# Patient Record
Sex: MALE | Race: WHITE | NOT HISPANIC OR LATINO | URBAN - METROPOLITAN AREA
[De-identification: names, ages, dates, MRNs, and addresses within clinical notes are randomized per-mention and may not be internally consistent; named-entity substitution may affect disease eponyms.]

---

## 2017-07-12 ENCOUNTER — IMPORTED ENCOUNTER (OUTPATIENT)
Dept: URBAN - METROPOLITAN AREA CLINIC 31 | Facility: CLINIC | Age: 67
End: 2017-07-12

## 2017-07-12 PROBLEM — H25.813: Noted: 2017-07-12

## 2017-07-12 PROBLEM — E11.9: Noted: 2017-07-12

## 2017-07-12 PROBLEM — H53.2: Noted: 2017-07-12

## 2017-07-12 PROCEDURE — 92015 DETERMINE REFRACTIVE STATE: CPT

## 2017-07-12 PROCEDURE — 92060 SENSORIMOTOR EXAMINATION: CPT

## 2017-07-12 PROCEDURE — 92014 COMPRE OPH EXAM EST PT 1/>: CPT

## 2017-07-12 NOTE — PATIENT DISCUSSION
1. Combined Types of Cataract OU: Explained how cataracts can effect vision. Recommend clinical observation. The patient was advised to contact us if any change or worsening of vision. 2. 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 Increase prism to 4.5 BD OD and 4.5 BU OS. Does not have to change immediately. Relatively comfortable with present glasses. 3.  DM II without sign of Diabetic Retinopathy OU:  Discussed the pathophysiology of diabetes and its effect on the eye. Stressed the importance of regular followup and good control of BS BP and Lipids to avoid future complications. 4. Return for an appointment in 1 year for comprehensive exam. with Dr. Jennifer Echevarria.

## 2018-07-12 ENCOUNTER — IMPORTED ENCOUNTER (OUTPATIENT)
Dept: URBAN - METROPOLITAN AREA CLINIC 31 | Facility: CLINIC | Age: 68
End: 2018-07-12

## 2018-07-12 PROBLEM — H43.812: Noted: 2018-07-12

## 2018-07-12 PROBLEM — H02.831: Noted: 2018-07-12

## 2018-07-12 PROBLEM — E11.9: Noted: 2018-07-12

## 2018-07-12 PROBLEM — H53.2: Noted: 2018-07-12

## 2018-07-12 PROBLEM — H25.813: Noted: 2018-07-12

## 2018-07-12 PROBLEM — H02.834: Noted: 2018-07-12

## 2018-07-12 PROCEDURE — 92250 FUNDUS PHOTOGRAPHY W/I&R: CPT

## 2018-07-12 PROCEDURE — 92015 DETERMINE REFRACTIVE STATE: CPT

## 2018-07-12 PROCEDURE — 92014 COMPRE OPH EXAM EST PT 1/>: CPT

## 2018-07-12 NOTE — PATIENT DISCUSSION
1. Combined Types of Cataract OU: Explained how cataracts can effect vision. Recommend clinical observation. The patient was advised to contact us if any change or worsening of vision. 2.  DM II without sign of Diabetic Retinopathy OU:  Discussed the pathophysiology of diabetes and its effect on the eye. Stressed the importance of regular followup and good control of BS BP and Lipids to avoid future complications. 3. Dermatochalasis OU:  Patient currently asymptomatic. Observe. 4. Refractive error - Glasses change optional. 5.  Return for an appointment in 1 year for Optos with Dr. Allison Rodriges. 6.  PVD OS: Patient was cautioned to call our office immediately if they experience a substantial change in their symptoms such as an increase in floaters persistent flashes loss of visual field (may appear as a shadow or a curtain) or decrease in visual acuity as these may indicate a retinal tear or detachment. If this is a new problem patient will need to return for re-examination  as determined by the [address] 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

## 2019-07-12 ENCOUNTER — IMPORTED ENCOUNTER (OUTPATIENT)
Dept: URBAN - METROPOLITAN AREA CLINIC 31 | Facility: CLINIC | Age: 69
End: 2019-07-12

## 2019-07-12 PROBLEM — H43.812: Noted: 2019-07-12

## 2019-07-12 PROBLEM — H53.2: Noted: 2019-07-12

## 2019-07-12 PROBLEM — H02.831: Noted: 2019-07-12

## 2019-07-12 PROBLEM — E11.9: Noted: 2019-07-12

## 2019-07-12 PROBLEM — H25.813: Noted: 2019-07-12

## 2019-07-12 PROBLEM — H02.834: Noted: 2019-07-12

## 2019-07-12 PROCEDURE — 92014 COMPRE OPH EXAM EST PT 1/>: CPT

## 2019-07-12 PROCEDURE — 92250 FUNDUS PHOTOGRAPHY W/I&R: CPT

## 2019-07-12 PROCEDURE — 92015 DETERMINE REFRACTIVE STATE: CPT

## 2019-07-12 NOTE — PATIENT DISCUSSION
1. Combined Types of Cataract OU: Explained how cataracts can effect vision. Recommend clinical observation. The patient was advised to contact us if any change or worsening of vision. 2.  DM II without sign of Diabetic Retinopathy OU:  Discussed the pathophysiology of diabetes and its effect on the eye. Stressed the importance of regular followup and good control of BS BP and Lipids to avoid future complications. 3. Dermatochalasis OU:  Patient currently asymptomatic. Observe. 4. Refractive error - Glasses change optional. 6.  PVD OS: Patient was cautioned to call our office immediately if they experience a substantial change in their symptoms such as an increase in floaters persistent flashes loss of visual field (may appear as a shadow or a curtain) or decrease in visual acuity as these may indicate a retinal tear or detachment. If this is a new problem patient will need to return for re-examination  as determined by the [address] 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

## 2020-06-15 NOTE — PATIENT DISCUSSION
renew CL Rx as is - pt wishes to sleep in CTL AMA.   ed daily option less likely to have risk of infx and long-term complications assoc with EW CTL use.

## 2020-07-14 ENCOUNTER — IMPORTED ENCOUNTER (OUTPATIENT)
Dept: URBAN - METROPOLITAN AREA CLINIC 31 | Facility: CLINIC | Age: 70
End: 2020-07-14

## 2020-07-14 PROBLEM — H25.813: Noted: 2020-07-14

## 2020-07-14 PROBLEM — E11.9: Noted: 2020-07-14

## 2020-07-14 PROBLEM — H02.831: Noted: 2020-07-14

## 2020-07-14 PROBLEM — H43.812: Noted: 2020-07-14

## 2020-07-14 PROBLEM — H53.2: Noted: 2020-07-14

## 2020-07-14 PROBLEM — H02.834: Noted: 2020-07-14

## 2020-07-14 PROCEDURE — 92014 COMPRE OPH EXAM EST PT 1/>: CPT

## 2020-07-14 PROCEDURE — 92015 DETERMINE REFRACTIVE STATE: CPT

## 2020-07-14 PROCEDURE — 92250 FUNDUS PHOTOGRAPHY W/I&R: CPT

## 2021-06-22 NOTE — PATIENT DISCUSSION
6/22/2021:  again advised against sleeping in CTL due to increased risk of infections and possible permanent VA loss.

## 2022-03-15 ENCOUNTER — IMPORTED ENCOUNTER (OUTPATIENT)
Dept: URBAN - METROPOLITAN AREA CLINIC 31 | Facility: CLINIC | Age: 72
End: 2022-03-15

## 2022-03-15 PROBLEM — H02.834: Noted: 2022-03-15

## 2022-03-15 PROBLEM — H53.2: Noted: 2022-03-15

## 2022-03-15 PROBLEM — H02.836: Noted: 2022-03-15

## 2022-03-15 PROBLEM — H43.812: Noted: 2022-03-15

## 2022-03-15 PROBLEM — H02.833: Noted: 2022-03-15

## 2022-03-15 PROBLEM — E11.9: Noted: 2022-03-15

## 2022-03-15 PROBLEM — H25.813: Noted: 2022-03-15

## 2022-03-15 PROBLEM — H25.812: Noted: 2022-03-15

## 2022-03-15 PROBLEM — H25.811: Noted: 2022-03-15

## 2022-03-15 PROBLEM — H02.831: Noted: 2022-03-15

## 2022-03-15 PROCEDURE — 92015 DETERMINE REFRACTIVE STATE: CPT

## 2022-03-15 PROCEDURE — 99214 OFFICE O/P EST MOD 30 MIN: CPT

## 2022-03-15 NOTE — PATIENT DISCUSSION
1. Combined Types of Cataract OU: Monitor. 2.  DM II without sign of Diabetic Retinopathy OU: Monitor yearly. 3. Dermatochalasis OU:  Patient currently asymptomatic. Observe. 4. Refractive error - Glasses change optional. Stay with present prism. 6. PVD OS: Monitor. 7. 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 8.  Return for an appointment in 1 year for comprehensive exam. with Dr. Huma Carranza

## 2022-04-02 ASSESSMENT — VISUAL ACUITY
OS_SC: 20/20+2
OS_SC: 20/25
OD_SC: 20/20+2
OD_SC: 20/20-2

## 2022-04-02 ASSESSMENT — TONOMETRY
OS_IOP_MMHG: 16
OS_IOP_MMHG: 15
OS_IOP_MMHG: 15
OD_IOP_MMHG: 16
OS_IOP_MMHG: 16
OS_IOP_MMHG: 13
OD_IOP_MMHG: 16

## 2022-07-25 NOTE — PATIENT DISCUSSION
I explained to the patient that even with successful cataract surgery, the vision will still be limited by the pre-existing amblyopia. The patient will not see better than their baseline vision.

## 2022-08-19 NOTE — PATIENT DISCUSSION
OK to proceed with IOL OS due to FD dec for Sx OS made today with [facility] and goal is for Synergy OS, goal of emmetropia.

## 2022-08-22 NOTE — PATIENT DISCUSSION
Adult Immunization Schedule  Vaccine How Often Disease Prevented Who Needs It   Influenza Every year Flu. This can be especially dangerous to elderly adults or people with immune disorders. All adults.   Tetanus, diphtheria (Td); or Tetanus, diphtheria, and pertussis (Tdap)* One dose of Tdap, then one dose of Td as a booster every 10 years Tetanus (lockjaw), a disease that causes muscles to spasm  Diphtheria, an infection that causes fever, weakness, and breathing problems  Pertussis, also known as whooping cough. This is a highly contagious disease that can cause serious illness. All adults  *This vaccine should be given during each pregnancy no matter how many years since the last vaccine. The vaccine increases protection for your . A  is too young to get the vaccine, but at the highest risk for severe illness and death from pertussis.   Varicella (Akira)** One series of 2 injections Chickenpox. This is a disease that causes itchy skin bumps, fever, and tiredness. It can lead to scarring, pneumonia, or brain inflammation. Adults who dont have evidence of immunity  **This vaccine should not be given to pregnant women. Women should avoid pregnancy for 4 weeks after the vaccine.   Human papillomavirus (HPV) One series of 3 injections Cervical cancer, caused by certain types of HPV  Vaginal and vulvar cancer  Penile cancer  Head and neck cancers  Anal cancer  Genital warts Females and males ages 26 and younger. Minimum age is 9 years.  (Ask your healthcare provider if this vaccine is right for you.)   Zoster--- 1 dose Herpes zoster (shingles), a painful rash marked by blisters Adults ages 60 and older.  ---You should not get this vaccine if your immune system is low. For example, if you have HIV, are taking medicines that suppress your immune system, or are getting cancer treatment.   Measles, mumps, rubella (MMR)** 1 or 2 doses, for ages 19 through 49; 1 dose for ages 50 and older if at risk Measles, a  6/22/2021:  again advised against sleeping in CTL due to increased risk of infections and possible permanent VA loss. disease marked by red spots, fever, and coughing  Mumps, a disease that causes swelling in the salivary glands. It may affect the ovaries or testes.  Rubella ([place] measles). This is a form of measles that can cause birth defects if a pregnant woman catches it. Adults born in 1957 or later who are not known to be immune to all 3 of these diseases. Ask your healthcare provider if you need a second dose.  **This vaccine should not be given to pregnant women. Women should avoid pregnancy for 4 weeks after vaccination.   Pneumococcal (PCV 13) 1 dose Pneumonia. This is an infection that causes inflammation in your lungs. It can lead to death. Adults ages 65 and older. Also, adults ages 19 and older with weak immune systems or any of these health conditions: chronic renal failure, nephrotic syndrome, or both. Or functional or anatomic asplenia, cerebrospinal fluid (CSF) leaks, or cochlear implants.   Pneumococcal (PPSV23)  1 or 2 doses Pneumonia.. This is an infection that causes inflammation in your lungs. It can lead to death. Adults ages 65 and older. Also, adults with chronic illnesses, such as asthma, COPD, heart disease, diabetes, liver disease, alcoholism, sickle cell disease, or history of splenectomy. In addition, adults with an immune disorder and adults who smoke cigarettes.   Meningococcal  (MCV or MPSV) 1 or more doses Meningococcal disease (bacterial meningitis). This is inflammation of the membrane covering the brain and spinal cord. It can lead to death. Adults with immune deficiencies or high risk of exposure. Also, college freshmen living in dormitories and  recruits.   Hepatitis A (HepA) One series of 2 injections Hepatitis A. This is an infection that can result in acute liver inflammation and yellow skin and whites of the eyes (jaundice). Adults with risk factors, such as clotting disorders or chronic liver disease, and adults with high risk of exposure   Hepatitis B (HepB) One series of 3  injections Hepatitis B. This is an infection that causes chronic, severe liver disease. Adults with high risk of exposure, such as healthcare providers and sanitation workers, and adults with diabetes   Travelers diseases As needed Infections such as cholera, typhoid, yellow fever, polio, rabies, meningococcal disease, hepatitis A, hepatitis B Adults traveling out of the country. Required vaccines will vary, depending on the country you visit. Check CDC website: www.cdc.gov.    ,  Based on the CDC National Immunization Program recommendations for adults.  Date Last Reviewed: 6/19/2014  © 8202-8555 [employer]. [address] [place] [address]. All rights reserved. This information is not intended as a substitute for professional medical care. Always follow your healthcare professional's instructions.        Prevention Guidelines, Men Ages 40 to 49  Screening tests and vaccines are an important part of managing your health. Health counseling is essential, too. Below are guidelines for these, for men ages 40 to 49. Talk with your healthcare provider to make sure youre up to date on what you need.  Screening Who needs it How often   Alcohol misuse All men in this age group At routine exams   Blood pressure All men in this age group Every 2 years if your blood pressure reading is less than 120/80 mm Hg; yearly if your systolic blood pressure reading is 120 to 139 mm Hg, or your diastolic blood pressure reading is 80 to 89 mm Hg   Depression All men in this age group At routine exams   Type 2 diabetes or prediabetes All adults beginning at age 45 and adults without symptoms at any age who are overweight or obese and have 1 or more other risk factors for diabetes At least every 3 years   Hepatitis C Men at increased risk for infection - talk with your healthcare provider At routine exams   High cholesterol or triglycerides All men ages 35 and older, and younger men at high risk for coronary artery  disease At least every 5 years   HIV All men At routine exams   Obesity All men in this age group At routine exams   Prostate cancer Starting at age 45, talk to healthcare provider about risks and benefits of digital rectal exam (LAWSON) and prostate-specific antigen (PSA) screening1 At routine exams   Syphilis Men at increased risk for infection - talk with your healthcare provider At routine exams   Tuberculosis Men at increased risk for infection - talk with your healthcare provider Check with your healthcare provider   Vision All men in this age group Every 2 to 4 years if no risk factors for eye disease2   Vaccine Who needs it How often   Chickenpox (varicella) All men in this age group who have no record of this infection or vaccine 2 doses; the second dose should be given at least 4 weeks after the first dose   Hepatitis A Men at increased risk for infection - talk with your healthcare provider 2 doses given at least 6 months apart   Hepatitis B Men at increased risk for infection - talk with your healthcare provider 3 doses over 6 months; second dose should be given 1 month after the first dose; the third dose should be given at least 2 months after the second dose and at least 4 months after the first dose   Haemophilus influenzae Type B (HIB) Men at increased risk for infection - talk with your healthcare provider 1 to 3 doses   Influenza (flu) All men in this age group Once a year   Measles, mumps, rubella (MMR) All men in this age group who have no record of these infections or vaccines 1 or 2 doses   Meningococcal Men at increased risk for infection - talk with your healthcare provider 1 or more doses   Pneumococcal conjugate vaccine (PCV13) and pneumococcal polysaccharide vaccine (PPSV23) Men at increased risk for infection - talk with your healthcare provider PCV13: 1 dose ages 19 to 65 (protects against 13 types of pneumococcal bacteria)     PPSV23: 1 to 2 doses through age 64, or 1 dose at 65 or older  (protects against 23 types of pneumococcal bacteria)      Tetanus/diphtheria/  pertussis (Td/Tdap) booster All men in this age group Td every 10 years, or a one-time dose of Tdap instead of a Td booster after age 18, then Td every 10 years   Counseling Who needs it How often   Diet and exercise Men who are overweight or obese When diagnosed, and then at routine exams   Sexually transmitted infection prevention Men at increased risk for infection - talk with your healthcare provider At routine exams   Use of daily aspirin Men ages 45 to 79 at risk for cardiovascular health problems At routine exams   Use of tobacco and the health affects it can cause All men in this age group Every exam   [address] [place] [address] [facility]   [facility] [employer]  Date Last Reviewed: 3/30/2015  © 5514-5898 [employer]. [address] [place] [address], [place] [address]. All rights reserved. This information is not intended as a substitute for professional medical care. Always follow your healthcare professional's instructions.

## 2023-05-18 ENCOUNTER — COMPREHENSIVE EXAM (OUTPATIENT)
Dept: URBAN - METROPOLITAN AREA CLINIC 29 | Facility: CLINIC | Age: 73
End: 2023-05-18

## 2023-05-18 DIAGNOSIS — H02.836: ICD-10-CM

## 2023-05-18 DIAGNOSIS — H53.2: ICD-10-CM

## 2023-05-18 DIAGNOSIS — H52.13: ICD-10-CM

## 2023-05-18 DIAGNOSIS — H52.203: ICD-10-CM

## 2023-05-18 DIAGNOSIS — H02.833: ICD-10-CM

## 2023-05-18 DIAGNOSIS — H25.813: ICD-10-CM

## 2023-05-18 DIAGNOSIS — H49.11: ICD-10-CM

## 2023-05-18 DIAGNOSIS — H52.4: ICD-10-CM

## 2023-05-18 DIAGNOSIS — E11.9: ICD-10-CM

## 2023-05-18 PROCEDURE — 99214 OFFICE O/P EST MOD 30 MIN: CPT

## 2023-05-18 PROCEDURE — 92015 DETERMINE REFRACTIVE STATE: CPT

## 2023-05-18 ASSESSMENT — TONOMETRY
OD_IOP_MMHG: 18
OS_IOP_MMHG: 17

## 2023-05-18 ASSESSMENT — VISUAL ACUITY
OS_CC: 20/25-1
OD_CC: 20/20-2

## 2024-01-04 NOTE — PATIENT DISCUSSION
Called patient to schedule injection and patient informed this RN that she has new insurance plan effective on 1/15/2024 and since her  is going through cancer treatment, she is waiting until February to get the injections.    NEW INSURANCE INFO:  [place] [employer] ST[employer]ION  Member ID# [id number]    This RN asked patient to upload photos of her insurance card to [employer] and to send our office a [employer] message to let us know when the new card is available in case the provided information is not enough information to process the referral.           No Retinal holes, Tears or Detachments.

## 2024-05-16 ENCOUNTER — COMPREHENSIVE EXAM (OUTPATIENT)
Dept: URBAN - METROPOLITAN AREA CLINIC 29 | Facility: CLINIC | Age: 74
End: 2024-05-16

## 2024-05-16 DIAGNOSIS — H53.2: ICD-10-CM

## 2024-05-16 DIAGNOSIS — E11.9: ICD-10-CM

## 2024-05-16 DIAGNOSIS — H57.813: ICD-10-CM

## 2024-05-16 DIAGNOSIS — H49.881: ICD-10-CM

## 2024-05-16 DIAGNOSIS — H02.833: ICD-10-CM

## 2024-05-16 DIAGNOSIS — H02.834: ICD-10-CM

## 2024-05-16 DIAGNOSIS — H02.836: ICD-10-CM

## 2024-05-16 DIAGNOSIS — H25.813: ICD-10-CM

## 2024-05-16 DIAGNOSIS — H49.11: ICD-10-CM

## 2024-05-16 PROCEDURE — 92014 COMPRE OPH EXAM EST PT 1/>: CPT

## 2024-05-16 ASSESSMENT — VISUAL ACUITY
OD_CC: 20/25
OU_CC: J1+
OS_CC: 20/30-1

## 2024-05-16 ASSESSMENT — TONOMETRY
OD_IOP_MMHG: 16
OS_IOP_MMHG: 16

## 2024-06-17 ENCOUNTER — CONSULTATION/EVALUATION (OUTPATIENT)
Dept: URBAN - METROPOLITAN AREA CLINIC 29 | Facility: CLINIC | Age: 74
End: 2024-06-17

## 2024-06-17 DIAGNOSIS — H49.881: ICD-10-CM

## 2024-06-17 DIAGNOSIS — E11.9: ICD-10-CM

## 2024-06-17 DIAGNOSIS — H49.11: ICD-10-CM

## 2024-06-17 DIAGNOSIS — H53.2: ICD-10-CM

## 2024-06-17 DIAGNOSIS — H43.812: ICD-10-CM

## 2024-06-17 DIAGNOSIS — H57.813: ICD-10-CM

## 2024-06-17 DIAGNOSIS — H02.836: ICD-10-CM

## 2024-06-17 DIAGNOSIS — H25.813: ICD-10-CM

## 2024-06-17 DIAGNOSIS — H02.833: ICD-10-CM

## 2024-06-17 DIAGNOSIS — H02.834: ICD-10-CM

## 2024-06-17 PROCEDURE — 92136 OPHTHALMIC BIOMETRY: CPT

## 2024-06-17 PROCEDURE — 99214 OFFICE O/P EST MOD 30 MIN: CPT

## 2024-06-17 ASSESSMENT — TONOMETRY
OD_IOP_MMHG: 15
OS_IOP_MMHG: 16

## 2024-06-17 ASSESSMENT — VISUAL ACUITY
OS_CC: 20/30
OS_CC: 20/25-2
OS_GLARE: 20/40
OD_CC: 20/25
OD_GLARE: 20/40
OD_CC: 20/30-2

## 2024-06-24 ENCOUNTER — SURGERY/PROCEDURE (OUTPATIENT)
Dept: URBAN - METROPOLITAN AREA SURGERY 17 | Facility: SURGERY | Age: 74
End: 2024-06-24

## 2024-06-24 DIAGNOSIS — H25.811: ICD-10-CM

## 2024-06-24 PROCEDURE — 66984 XCAPSL CTRC RMVL W/O ECP: CPT

## 2024-06-25 ENCOUNTER — POST-OP (OUTPATIENT)
Dept: URBAN - METROPOLITAN AREA CLINIC 29 | Facility: CLINIC | Age: 74
End: 2024-06-25

## 2024-06-25 DIAGNOSIS — Z96.1: ICD-10-CM

## 2024-06-25 PROCEDURE — 99024 POSTOP FOLLOW-UP VISIT: CPT

## 2024-06-25 ASSESSMENT — TONOMETRY
OD_IOP_MMHG: 22
OS_IOP_MMHG: 15

## 2024-06-25 ASSESSMENT — VISUAL ACUITY
OS_SC: 20/80-2
OD_SC: 20/40

## 2024-07-01 ENCOUNTER — SURGERY/PROCEDURE (OUTPATIENT)
Dept: URBAN - METROPOLITAN AREA SURGERY 17 | Facility: SURGERY | Age: 74
End: 2024-07-01

## 2024-07-01 DIAGNOSIS — H25.812: ICD-10-CM

## 2024-07-01 PROCEDURE — 66984 XCAPSL CTRC RMVL W/O ECP: CPT | Mod: 79,LT

## 2024-07-02 ENCOUNTER — POST-OP (OUTPATIENT)
Dept: URBAN - METROPOLITAN AREA CLINIC 29 | Facility: CLINIC | Age: 74
End: 2024-07-02

## 2024-07-02 DIAGNOSIS — Z96.1: ICD-10-CM

## 2024-07-02 PROCEDURE — 99024 POSTOP FOLLOW-UP VISIT: CPT

## 2024-07-02 ASSESSMENT — TONOMETRY
OD_IOP_MMHG: 12
OS_IOP_MMHG: 16

## 2024-07-02 ASSESSMENT — VISUAL ACUITY
OS_SC: 20/50
OS_PH: 20/30
OD_SC: 20/30

## 2024-07-09 ENCOUNTER — POST-OP (OUTPATIENT)
Dept: URBAN - METROPOLITAN AREA CLINIC 29 | Facility: CLINIC | Age: 74
End: 2024-07-09

## 2024-07-09 DIAGNOSIS — Z96.1: ICD-10-CM

## 2024-07-09 PROCEDURE — 99024 POSTOP FOLLOW-UP VISIT: CPT

## 2024-07-09 ASSESSMENT — VISUAL ACUITY
OS_SC: 20/100
OD_SC: 20/200
OS_SC: 20/20
OD_SC: 20/25

## 2024-07-09 ASSESSMENT — TONOMETRY
OD_IOP_MMHG: 12
OS_IOP_MMHG: 10

## 2024-10-09 NOTE — PATIENT DISCUSSION
1. Combined Types of Cataract OU: Explained how cataracts can effect vision. Recommend clinical observation. The patient was advised to contact us if any change or worsening of vision. 2.  DM II without sign of Diabetic Retinopathy OU: Monitor yearly. 3. Dermatochalasis OU:  Patient currently asymptomatic. Observe. 4. Refractive error - Glasses change optional. 6.  PVD OS: Monitor. 7. 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 8.  Return for an appointment in 1 year for comprehensive exam. with Dr. Sherry Mas. Last thyroid level done on 7/11/24. Also see MyChart from 7/11/24.       Thyroid is normal.   Written by Clementine Hernadez MA on 7/11/2024  1:51 PM CDT  Seen by patient Neetu Page on 10/8/2024  3:08 PM

## 2024-11-07 ENCOUNTER — FOLLOW UP (OUTPATIENT)
Dept: URBAN - METROPOLITAN AREA CLINIC 29 | Facility: CLINIC | Age: 74
End: 2024-11-07

## 2024-11-07 DIAGNOSIS — Z96.1: ICD-10-CM

## 2024-11-07 DIAGNOSIS — H52.4: ICD-10-CM

## 2024-11-07 DIAGNOSIS — E11.9: ICD-10-CM

## 2024-11-07 PROCEDURE — 99214 OFFICE O/P EST MOD 30 MIN: CPT

## 2024-11-07 PROCEDURE — 92015 DETERMINE REFRACTIVE STATE: CPT | Mod: NC

## 2025-03-06 ENCOUNTER — EMERGENCY VISIT (OUTPATIENT)
Age: 75
End: 2025-03-06

## 2025-03-06 DIAGNOSIS — H53.10: ICD-10-CM

## 2025-03-06 PROCEDURE — 99213 OFFICE O/P EST LOW 20 MIN: CPT
